# Patient Record
(demographics unavailable — no encounter records)

---

## 2025-01-08 NOTE — REASON FOR VISIT
[Initial Evaluation] : an initial evaluation for [FreeTextEntry2] : here for difficulty breathing through the nose, nasal congestion, nasal discharge, snoring, bilaterally clogged ears

## 2025-01-08 NOTE — ADDENDUM
[FreeTextEntry1] :  scribe attestation; I, Biju Sanford, am scribing for and in the presence of Dr. Sergio Reyes in the following sections HISTORY OF PRESENT ILLNESS, PAST MEDICAL/FAMILY/SOCIAL HISTORY; REVIEW OF SYSTEMS; VITAL SIGNS; PHYSICAL EXAM; PROCEDURES; DISCUSSION.   I, Dr. Sergio Reyes, personally performed the services described in the documentation, reviewed the documentation recorded by the scribe in my presence, and it accurately and completely records my words and actions.

## 2025-01-08 NOTE — HISTORY OF PRESENT ILLNESS
[de-identified] : 41 year old male here for difficulty breathing through the nose, nasal congestion, nasal discharge, snoring, bilaterally clogged ears.  States symptoms for years.  Denies sinus pain, sinus pressure, post nasal drip.  Denies sinus infections in the past year.  Patient denies otalgia, otorrhea, ear infections, hearing loss, tinnitus, dizziness, vertigo, headaches related to hearing. States snoring, sometimes with pausing, gasping and choking.  Denies sleep study.

## 2025-01-08 NOTE — PHYSICAL EXAM
[] : septum deviated to the left [Midline] : trachea located in midline position [Normal] : no rashes [FreeTextEntry1] : difficulty breathing through the nose, nasal congestion, nasal discharge, snoring, bilaterally clogged ears [de-identified] : left ear, anterior inferior small reddish mass.  [de-identified] : hypertrophy. Horizonal septal fracture on the right.  [de-identified] : 2+/3+

## 2025-01-08 NOTE — PROCEDURE
[Image(s) Captured] : image(s) captured and filed [Video Captured] : video captured and filed [Topical Lidocaine] : topical lidocaine [Oxymetazoline HCl] : oxymetazoline HCl [Flexible Endoscope] : examined with the flexible endoscope [Serial Number: ___] : Serial Number: [unfilled] [Unable to Cooperate with Mirror] : patient unable to cooperate with mirror [Complicated Symptoms] : complicated symptoms requiring more thorough examination than provided by mirror [Normal] : posterior cricoid area had healthy pink mucosa in the interarytenoid area and the esophageal inlet [de-identified] : Patient was placed in the examination chair in a sitting position. The nose was decongested with oxymetazoline nasal solution. The airway was anesthetized with 4% Xylocaine.  The back of the throat was anesthetized with Cetacaine. Direct flexible/rigid video endoscopy was performed. Findings revealed: deviated septum to the left, horizontal fracture line on the right, turbinate hypertrophy, positive Baker maneuver. Larynx, epiglottis, and vocal cords are otherwise normal.  [FreeTextEntry4] : + Baker maneuver [de-identified] : thick

## 2025-07-25 NOTE — HISTORY OF PRESENT ILLNESS
[FreeTextEntry1] : establish care, annual exam [de-identified] : Mr. Rebel Mccray (Larry) is a 43 yo male presents today to establish care and for annual comprehensive exam. Pt today overall feels well, states occasionally notes left area groin/pelvic discomfort, Pt reports discomfort upon squatting and getting, upon ambulation. 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 Pt denies any fall/injury, denies any groin bulge. Pt advised today likely groin strain. Rest, warm compresses, avoid strenuous activities, and NSAIDs after meals for pain control. Pt today denies any fever, chills, n/v/c/d.

## 2025-07-25 NOTE — ASSESSMENT
[Vaccines Reviewed] : Immunizations reviewed today. Please see immunization details in the vaccine log within the immunization flowsheet.  [FreeTextEntry1] : completed physical exam, blood work and urinalysis ordered today, will follow up accordingly. HCM: up to date vaccines: up to date vitals ECG within baseline advised further weight loss avoid strenuous activities for the groin strain, NSAIDs prn, warm compresses if worsening then return

## 2025-07-25 NOTE — HEALTH RISK ASSESSMENT
[Good] : ~his/her~  mood as  good [Yes] : Yes [Monthly or less (1 pt)] : Monthly or less (1 point) [1 or 2 (0 pts)] : 1 or 2 (0 points) [Never (0 pts)] : Never (0 points) [No] : In the past 12 months have you used drugs other than those required for medical reasons? No [No falls in past year] : Patient reported no falls in the past year [0] : 2) Feeling down, depressed, or hopeless: Not at all (0) [PHQ-2 Negative - No further assessment needed] : PHQ-2 Negative - No further assessment needed [HIV Test offered] : HIV Test offered [Hepatitis C test offered] : Hepatitis C test offered [None] : None [With Family] : lives with family [Employed] : employed [College] : College [] :  [# Of Children ___] : has [unfilled] children [Sexually Active] : sexually active [Feels Safe at Home] : Feels safe at home [Fully functional (bathing, dressing, toileting, transferring, walking, feeding)] : Fully functional (bathing, dressing, toileting, transferring, walking, feeding) [Fully functional (using the telephone, shopping, preparing meals, housekeeping, doing laundry, using] : Fully functional and needs no help or supervision to perform IADLs (using the telephone, shopping, preparing meals, housekeeping, doing laundry, using transportation, managing medications and managing finances) [Smoke Detector] : smoke detector [Carbon Monoxide Detector] : carbon monoxide detector [Safety elements used in home] : safety elements used in home [Seat Belt] :  uses seat belt [Sunscreen] : uses sunscreen [With Patient/Caregiver] : , with patient/caregiver [Reviewed no changes] : Reviewed, no changes [Name: ___] : Health Care Proxy's Name: [unfilled]  [Relationship: ___] : Relationship: [unfilled] [Aggressive treatment] : aggressive treatment [Never] : Never [NO] : No [Audit-CScore] : 1 [[id number]] : 0 [Change in mental status noted] : No change in mental status noted [Language] : denies difficulty with language [Behavior] : denies difficulty with behavior [Learning/Retaining New Information] : denies difficulty learning/retaining new information [Handling Complex Tasks] : denies difficulty handling complex tasks [Reasoning] : denies difficulty with reasoning [Spatial Ability and Orientation] : denies difficulty with spatial ability and orientation [High Risk Behavior] : no high risk behavior [Reports changes in hearing] : Reports no changes in hearing [Reports changes in vision] : Reports no changes in vision [Reports changes in dental health] : Reports no changes in dental health [de-identified] : dentist twice a year [AdvancecareDate] : 07/25/2025